# Patient Record
Sex: FEMALE | Race: OTHER | Employment: FULL TIME | URBAN - METROPOLITAN AREA
[De-identification: names, ages, dates, MRNs, and addresses within clinical notes are randomized per-mention and may not be internally consistent; named-entity substitution may affect disease eponyms.]

---

## 2021-10-22 ENCOUNTER — TELEPHONE (OUTPATIENT)
Dept: SURGERY | Age: 41
End: 2021-10-22

## 2021-10-22 NOTE — TELEPHONE ENCOUNTER
Called patient and left a voicemail to remind her of her upcoming appointment, our cancellation policy, our late policy and no visitor rule.

## 2021-10-25 ENCOUNTER — OFFICE VISIT (OUTPATIENT)
Dept: SURGERY | Age: 41
End: 2021-10-25

## 2021-10-25 VITALS
HEART RATE: 77 BPM | DIASTOLIC BLOOD PRESSURE: 76 MMHG | SYSTOLIC BLOOD PRESSURE: 117 MMHG | RESPIRATION RATE: 16 BRPM | BODY MASS INDEX: 31.04 KG/M2 | HEIGHT: 59 IN | TEMPERATURE: 98.3 F | OXYGEN SATURATION: 99 % | WEIGHT: 154 LBS

## 2021-10-25 DIAGNOSIS — K42.9 UMBILICAL HERNIA WITHOUT OBSTRUCTION AND WITHOUT GANGRENE: Primary | ICD-10-CM

## 2021-10-25 PROBLEM — E66.9 CLASS 1 OBESITY IN ADULT: Status: ACTIVE | Noted: 2021-10-25

## 2021-10-25 PROCEDURE — 99202 OFFICE O/P NEW SF 15 MIN: CPT | Performed by: SURGERY

## 2021-10-25 NOTE — PROGRESS NOTES
1. Have you been to the ER, urgent care clinic since your last visit? Hospitalized since your last visit? No    2. Have you seen or consulted any other health care providers outside of the [address] [place] [address] since your last visit? Include any pap smears or colon screening.  No

## 2021-10-25 NOTE — PROGRESS NOTES
Evelia Fraser is a 35 y.o. female who presents for evaluation of periumbilical abdominal pain. Information obtained via . Ms. Bhavna Hayden tells me that she has been experiencing periumbilical abdominal pain for approximately one year now. The pain has become progressively worse. Ms. Bhavna Hayden has also noted a bulge at the umbilicus. Furthermore, the pain occurs after meals. Occasional NSAID use. Associated abdominal bloating. No nausea or vomitting. No fevers or chills. No diarrhea. No clear h/o liane colored stool or tea colored urine. No dysuria or hematuria. Found to have an umbilical hernia. She has otherwise been in her usual state of health. Past Medical History:   Diagnosis Date    Class 1 obesity in adult 10/25/2021    Sinus congestion     Umbilical hernia without obstruction and without gangrene 10/25/2021     Past Surgical History:   Procedure Laterality Date    HX  SECTION      x2     History reviewed. No pertinent family history. Social History     Socioeconomic History    Marital status: SINGLE     Spouse name: Not on file    Number of children: Not on file    Years of education: Not on file    Highest education level: Not on file   Tobacco Use    Smoking status: Never Smoker    Smokeless tobacco: Never Used   Substance and Sexual Activity    Alcohol use: No     Social Determinants of Health     Financial Resource Strain:     Difficulty of Paying Living Expenses:    Food Insecurity:     Worried About Running Out of Food in the Last Year:     [address] in the Last Year:    Transportation Needs:     Lack of Transportation (Medical):      Lack of Transportation (Non-Medical):    Physical Activity:     Days of Exercise per Week:     Minutes of Exercise per Session:    Stress:     Feeling of Stress :    Social Connections:     Frequency of Communication with Friends and Family:     Frequency of Social Gatherings with Friends and Family:     Attends Jehovah's witness Services:     Active Member of Clubs or Organizations:     Attends Club or Organization Meetings:     Marital Status:      Review of systems negative except as noted. Review of Systems   Constitutional: Negative for chills and fever. Gastrointestinal: Positive for abdominal pain (Periumbilical.). Negative for nausea and vomiting. Abdominal bloating. No clear h/o liane colored stool. Genitourinary: Negative for dysuria and hematuria. No clear h/o tea colored urine. Physical Exam  Vitals reviewed. Constitutional:       General: She is not in acute distress. Appearance: Normal appearance. She is obese. HENT:      Head: Normocephalic and atraumatic. Eyes:      General: No scleral icterus. Cardiovascular:      Rate and Rhythm: Regular rhythm. Pulmonary:      Effort: Pulmonary effort is normal.   Abdominal:      General: There is no distension. Palpations: Abdomen is soft. Tenderness: There is no abdominal tenderness. There is no guarding or rebound. Hernia: A hernia is present. Hernia is present in the umbilical area (Small. Reducible. ). Musculoskeletal:         General: Normal range of motion. Cervical back: Neck supple. Lymphadenopathy:      Cervical: No cervical adenopathy. Neurological:      General: No focal deficit present. Mental Status: She is alert. ASSESSMENT and PLAN  Ms. Eden Mcclure is a 34 yo female with a small umbilical hernia. In view of the findings on H and P, she should benefit from repair since the hernia is symptomatic. However, also concerned that some of her symptoms may be due to cholelithiasis. Will check an abdominal ultrasound and will see her following that to review findings with her. If Ms. Eden Mcclure is found to have cholelithiasis, then she should also benefit from cholecystectomy as well. Discussed plan with Ms. Huntley, via her , and she is agreeable.

## 2021-11-01 ENCOUNTER — OFFICE VISIT (OUTPATIENT)
Dept: SURGERY | Age: 41
End: 2021-11-01

## 2021-11-01 ENCOUNTER — HOSPITAL ENCOUNTER (OUTPATIENT)
Dept: ULTRASOUND IMAGING | Age: 41
Discharge: HOME | End: 2021-11-01
Attending: SURGERY

## 2021-11-01 VITALS
OXYGEN SATURATION: 98 % | DIASTOLIC BLOOD PRESSURE: 77 MMHG | HEIGHT: 59 IN | BODY MASS INDEX: 31.45 KG/M2 | HEART RATE: 68 BPM | RESPIRATION RATE: 18 BRPM | SYSTOLIC BLOOD PRESSURE: 128 MMHG | WEIGHT: 156 LBS | TEMPERATURE: 98.3 F

## 2021-11-01 DIAGNOSIS — K42.9 UMBILICAL HERNIA WITHOUT OBSTRUCTION AND WITHOUT GANGRENE: ICD-10-CM

## 2021-11-01 DIAGNOSIS — K42.9 UMBILICAL HERNIA WITHOUT OBSTRUCTION AND WITHOUT GANGRENE: Primary | ICD-10-CM

## 2021-11-01 PROCEDURE — 76700 US EXAM ABDOM COMPLETE: CPT

## 2021-11-01 PROCEDURE — 99212 OFFICE O/P EST SF 10 MIN: CPT | Performed by: SURGERY

## 2021-11-01 NOTE — PROGRESS NOTES
1. Have you been to the ER, urgent care clinic since your last visit? Hospitalized since your last visit? No    2. Have you seen or consulted any other health care providers outside of the [address] [place] [address] since your last visit? Include any pap smears or colon screening.  No

## 2021-11-01 NOTE — PROGRESS NOTES
Chanda Taveras is a 35 y.o. female who returns following abdominal ultrasound. Information obtained from patient via . Ms. Barbara Lerma was last seen on 2021 for evaluation of periumbilical abdominal pain. Doing well since then. No change in periumbilical abdominal pain. No associated nausea or vomitting. No fevers or chills. Found to have an umbilical hernia as well as a concern for symptomatic cholelithiasis. She has otherwise been in her usual state of health. Abdominal ultrasound - 2021 - Hepatic steatosis. Past Medical History:   Diagnosis Date    Class 1 obesity in adult 10/25/2021    Sinus congestion     Umbilical hernia without obstruction and without gangrene 10/25/2021     Past Surgical History:   Procedure Laterality Date    HX  SECTION      x2     History reviewed. No pertinent family history. Social History     Socioeconomic History    Marital status: SINGLE     Spouse name: Not on file    Number of children: Not on file    Years of education: Not on file    Highest education level: Not on file   Tobacco Use    Smoking status: Never Smoker    Smokeless tobacco: Never Used   Substance and Sexual Activity    Alcohol use: No     Social Determinants of Health     Financial Resource Strain:     Difficulty of Paying Living Expenses:    Food Insecurity:     Worried About Running Out of Food in the Last Year:     [address] in the Last Year:    Transportation Needs:     Lack of Transportation (Medical):      Lack of Transportation (Non-Medical):    Physical Activity:     Days of Exercise per Week:     Minutes of Exercise per Session:    Stress:     Feeling of Stress :    Social Connections:     Frequency of Communication with Friends and Family:     Frequency of Social Gatherings with Friends and Family:     Attends Hinduism Services:     Active Member of Clubs or Organizations:     Attends Club or Organization Meetings:    Pinky Marital Status:      Review of systems negative except as noted. Review of Systems   Constitutional: Negative for chills and fever. Gastrointestinal: Positive for abdominal pain. Negative for nausea and vomiting. Physical Exam  Vitals reviewed. Constitutional:       General: She is not in acute distress. Appearance: Normal appearance. She is obese. HENT:      Head: Normocephalic and atraumatic. Cardiovascular:      Rate and Rhythm: Normal rate and regular rhythm. Pulmonary:      Effort: Pulmonary effort is normal.      Breath sounds: Normal breath sounds. Abdominal:      General: There is no distension. Palpations: Abdomen is soft. Tenderness: There is no abdominal tenderness. There is no guarding or rebound. Hernia: A hernia is present. Hernia is present in the umbilical area. Musculoskeletal:         General: Normal range of motion. Neurological:      General: No focal deficit present. Mental Status: She is alert. ASSESSMENT and PLAN  Reviewed ultrasound. Ms. Bee Retana is a 35 y.o. female with an umbilical hernia. Reviewed ultrasound findings with Ms. Huntley and reassured her that no gallstones were noted on the study. In terms of the umbilical hernia, she should benefit from repair since the hernia is symptomatic. I discussed umbilical hernia repair, possibly with mesh, with her today, via her ,  including the potential risks of bleeding, infection and hernia recurrence. She understands and will contact the office to schedule surgery or if any questions or concerns arise.

## 2022-03-18 PROBLEM — E66.9 CLASS 1 OBESITY IN ADULT: Status: ACTIVE | Noted: 2021-10-25

## 2022-03-18 PROBLEM — K42.9 UMBILICAL HERNIA WITHOUT OBSTRUCTION AND WITHOUT GANGRENE: Status: ACTIVE | Noted: 2021-10-25

## 2022-03-22 ENCOUNTER — OFFICE VISIT (OUTPATIENT)
Dept: ENT CLINIC | Age: 42
End: 2022-03-22

## 2022-03-22 VITALS
BODY MASS INDEX: 29.45 KG/M2 | DIASTOLIC BLOOD PRESSURE: 62 MMHG | OXYGEN SATURATION: 99 % | RESPIRATION RATE: 16 BRPM | TEMPERATURE: 97.8 F | HEART RATE: 97 BPM | WEIGHT: 150 LBS | HEIGHT: 60 IN | SYSTOLIC BLOOD PRESSURE: 116 MMHG

## 2022-03-22 DIAGNOSIS — J34.3 HYPERTROPHY OF BOTH INFERIOR NASAL TURBINATES: ICD-10-CM

## 2022-03-22 DIAGNOSIS — R42 DIZZINESS: ICD-10-CM

## 2022-03-22 DIAGNOSIS — H92.03 OTALGIA, BILATERAL: Primary | ICD-10-CM

## 2022-03-22 DIAGNOSIS — H93.8X1 PRESSURE SENSATION IN RIGHT EAR: ICD-10-CM

## 2022-03-22 PROCEDURE — 99204 OFFICE O/P NEW MOD 45 MIN: CPT | Performed by: NURSE PRACTITIONER

## 2022-03-22 RX ORDER — LORATADINE 10 MG/1
10 TABLET ORAL
COMMUNITY

## 2022-03-22 RX ORDER — BISMUTH SUBSALICYLATE 262 MG
1 TABLET,CHEWABLE ORAL DAILY
COMMUNITY

## 2022-03-22 RX ORDER — PREDNISONE 10 MG/1
TABLET ORAL
Qty: 24 TABLET | Refills: 0 | Status: SHIPPED | OUTPATIENT
Start: 2022-03-22

## 2022-03-22 NOTE — PROGRESS NOTES
Visit Vitals  /62 (BP 1 Location: Left upper arm, BP Patient Position: Sitting, BP Cuff Size: Large adult)   Pulse 97   Temp 97.8 °F (36.6 °C) (Temporal)   Resp 16   Ht 5' (1.524 m)   Wt 150 lb (68 kg)   SpO2 99%   BMI 29.29 kg/m²     Chief Complaint   Patient presents with    New Patient

## 2022-03-22 NOTE — PROGRESS NOTES
Otolaryngology-Head and Neck Surgery  New Patient Visit     Patient: Kurt Mason  YOB: 1980  MRN: [id number]  Date of Service: 3/22/2022    Chief Complaint: Bilateral otalgia    History of Present Illness: Kurt Mason is a 35y.o. year old female who presents today for discussion of      Reports bilateral otalgia for 10 years but worsening over past 3 years  Worse in right  +dizziness, congestion, feeling of wet ears, pressure in right ear, HA  Denies hearing changes  Describes the dizziness as constant nightly, room-spinning, lasting seconds -5 minutes  Worse with bending    Has seen PCP for past 3 years and had chronic sinus infections and given antibiotics which have helped until now    Tried claritin and only helps with congestion, Flonase    No ENT surgical HX      Past Medical History:  Past Medical History:   Diagnosis Date    Ear problems     Reflux gastritis     Sinus problem        Past Surgical History:   Past Surgical History:   Procedure Laterality Date    HX APPENDECTOMY N/A 03/20/2022       Medications:   Current Outpatient Medications   Medication Instructions    loratadine (CLARITIN) 10 mg, Oral    multivitamin (ONE A DAY) tablet 1 Tablet, Oral, DAILY       Allergies: Allergies   Allergen Reactions    Amoxicillin Itching       Social History:   Social History     Tobacco Use    Smoking status: Never Smoker    Smokeless tobacco: Never Used   Substance Use Topics    Alcohol use: Not on file    Drug use: Not on file        Family History:  History reviewed. No pertinent family history. Review of Systems:    Consitutional: denies fever, excessive weight gain or loss. Eyes: denies diplopia, eye pain. Integumentary: denies new concerning skin lesions. Ears, Nose, Mouth, Throat: denies except as per HPI.   Endocrine: denies hot or cold intolerance, increased thirst.  Respiratory: denies cough, hemoptysis, wheezing  Gastrointestinal: denies trouble swallowing, nausea, emesis, regurgitation  Musculoskeletal: denies muscle weakness or wasting  Cardiovascular: denies chest pain, shortness of breath  Neurologic: denies seizures, numbness or tingling, syncope  Hematologic: denies easy bleeding or bruising    Physical Examination:   Vitals:    03/22/22 1439   BP: 116/62   BP 1 Location: Left upper arm   BP Patient Position: Sitting   BP Cuff Size: Large adult   Pulse: 97   Temp: 97.8 °F (36.6 °C)   TempSrc: Temporal   Resp: 16   Height: 5' (1.524 m)   Weight: 150 lb (68 kg)   SpO2: 99%        General: Comfortable, pleasant, appears stated age  Voice: Strong, speaking in full sentences, no stridor    Face: No masses or lesions, facial strength symmetric. No nystagmus. Ears: External ears unremarkable. Bilateral ear canal clear. Tympanic membrane clear and intact, with visible landmarks. Clear middle ear space. Right TM sclerosis. Nose: External nose unremarkable. Dorsum midline. Anterior rhinoscopy demonstrates no lesions. Septum midline. Turbinates with hypertrophy. Oral Cavity / Oropharynx: No trismus. Mucosa pink and moist. No lesions. Tongue is midline and mobile. Palate elevates symmetrically. Uvula midline. Tonsils unremarkable. Base of tongue soft. Floor of mouth soft. Neck: Supple. No adenopathy. Thyroid unremarkable. Palpable laryngeal landmarks. Full neck range of motion   Neurologic: CN II - XI intact. Normal gait  G[place] Hallpike: negative    Assessment and Plan:   1. Bilateral otalgia   2. Pressure in right ear  3. Dizziness  4. Hypertrophy of turbinates    -Trial Flonase daily. Reinforced technique.   -Will RX steroid trial. Discussed side effects.  -[place] negative in office. Patient became extremely dizzy but no nystagmus noted. -May consider neurology consult: vestibular migraine? VNG?   -Low salt/caffeine diet. Patient with constant pressure in right ear. -Return to office in 3-4 weeks.            Russell Man MSN, FNP-C  [address] [facility]  [address] [facility]  Office Phone: [phone]

## 2022-04-27 ENCOUNTER — TELEPHONE (OUTPATIENT)
Dept: ENT CLINIC | Age: 42
End: 2022-04-27

## 2022-04-27 NOTE — TELEPHONE ENCOUNTER
LVM using  service informing pt that appt scheduled 4/29 has been cancelled due to [facility] no longer practicing at our office.     instructed pt (per my instruction) to return call to r/s

## 2022-05-04 ENCOUNTER — OFFICE VISIT (OUTPATIENT)
Dept: ENT CLINIC | Age: 42
End: 2022-05-04

## 2022-05-04 VITALS
SYSTOLIC BLOOD PRESSURE: 106 MMHG | RESPIRATION RATE: 18 BRPM | DIASTOLIC BLOOD PRESSURE: 76 MMHG | HEIGHT: 60 IN | OXYGEN SATURATION: 97 % | WEIGHT: 150.6 LBS | BODY MASS INDEX: 29.57 KG/M2 | HEART RATE: 100 BPM

## 2022-05-04 DIAGNOSIS — J32.0 CHRONIC MAXILLARY SINUSITIS: Primary | ICD-10-CM

## 2022-05-04 DIAGNOSIS — H81.10 BENIGN PAROXYSMAL POSITIONAL VERTIGO, UNSPECIFIED LATERALITY: ICD-10-CM

## 2022-05-04 PROCEDURE — 99213 OFFICE O/P EST LOW 20 MIN: CPT | Performed by: SPECIALIST

## 2022-05-04 RX ORDER — SULFAMETHOXAZOLE AND TRIMETHOPRIM 800; 160 MG/1; MG/1
1 TABLET ORAL 2 TIMES DAILY
Qty: 20 TABLET | Refills: 0 | Status: SHIPPED | OUTPATIENT
Start: 2022-05-04 | End: 2022-05-14

## 2022-05-04 NOTE — PROGRESS NOTES
Subjective:        Guillermo Purcell   35 y.o.   1980     Return patient Visit  22-year-old Welsh-speaking female whose history is obtained through an . The patient was previously seen for otalgia and headache and was treated with Flonase and steroids. Unfortunately the steroids caused her heart to beat fast and she discontinued this. She relates that she has pressure in her forehead and cheeks. She sometimes has dizziness described as an off-balance sensation or spinning that can for 30 seconds to a minute and is associated with laying down and rolling to one side of the other and also with looking up. She denies any change in her hearing. Review of Systems  ROS         Heent: No diplopia, no hearing loss, no tinnitis, no nasal congestion, no sinus pain, no dysphygia, no sore throat. Neck:  No neck mass, no neck pain  Respiratory:  No cough, no hemoptysis, no SOB, no wheezing  CV:  No chest pain, no arrythmias, no syncope  GI:  No nausea, no vomiting, no abdominal pain  Neuro:  No headache, no loss of consciousness, no paralysis, no weakness      Physical Exam    General: NAD, well-developed well-nourished  Eyes: PERRLA, EOMs intact no nystagmus  Ears: External canals clear, TMs:  Clear, Tuning fork exam normal  Septum midline, turbinates normal, mucosa normal, no external deformity  Mouth: Mucosa normal, tongue normal, floor of mouth normal  Throat: Clear, tonsils absent  Neck: Supple without masses, no bruits     Neuro: Cranial nerves II through XII grossly intact         Past Medical History:   Diagnosis Date    Ear problems     Reflux gastritis     Sinus problem      Past Surgical History:   Procedure Laterality Date    HX APPENDECTOMY N/A 03/20/2022      History reviewed. No pertinent family history.   Social History     Tobacco Use    Smoking status: Never Smoker    Smokeless tobacco: Never Used   Substance Use Topics    Alcohol use: Not on file      Prior to Admission medications    Medication Sig Start Date End Date Taking? Authorizing Provider   trimethoprim-sulfamethoxazole (BACTRIM DS, SEPTRA DS) 160-800 mg per tablet Take 1 Tablet by mouth two (2) times a day for 10 days. 5/4/22 5/14/22 Yes Alexandrea Choudhury MD   loratadine (Claritin) 10 mg tablet Take 10 mg by mouth. Patient not taking: Reported on 5/4/2022    Provider, Historical   multivitamin (ONE A DAY) tablet Take 1 Tablet by mouth daily. Patient not taking: Reported on 5/4/2022    Provider, Historical   predniSONE (DELTASONE) 10 mg tablet Take 30 mg x 4 days, then 20 mg x 4 days, then 10 mg x 4 days then stop  Patient not taking: Reported on 5/4/2022 3/22/22   Pavithra Kenney NP                    Objective:     Visit Vitals  /76   Pulse 100   Resp 18   Ht 5' (1.524 m)   Wt 150 lb 9.6 oz (68.3 kg)   SpO2 97%   BMI 29.41 kg/m²        Allergies   Allergen Reactions    Amoxicillin Itching         Assessment/Plan:   Sinusitis, probable BPPV: CT scan of sinuses, Bactrim DS, follow-up 1 month, discussed BPPV through  and told her that the prognosis for this is probably very good but that it could recur. Encounter Diagnoses   Name Primary?  Chronic maxillary sinusitis Yes    Benign paroxysmal positional vertigo, unspecified laterality      Orders Placed This Encounter    CT MAXILLOFACIAL WO CONT    trimethoprim-sulfamethoxazole (BACTRIM DS, SEPTRA DS) 160-800 mg per tablet     Follow-up and Dispositions    · Return in about 1 month (around 6/4/2022). Betty Vallecillo MD, [address] [facility]    [address]  [facility], [address] [facility] [phone]

## 2022-05-04 NOTE — PROGRESS NOTES
Chief Complaint   Patient presents with    Nasal Polyps    Ear Pain     bilateral        Visit Vitals  Pulse 100   Resp 18   Ht 5' (1.524 m)   Wt 150 lb 9.6 oz (68.3 kg)   SpO2 97%   BMI 29.41 kg/m²

## 2022-05-23 ENCOUNTER — TELEPHONE (OUTPATIENT)
Dept: ENT CLINIC | Age: 42
End: 2022-05-23

## 2022-05-23 NOTE — TELEPHONE ENCOUNTER
Called pt using the  service in regards to rescheduling her appt with Dr. Stevie Duffy on 06/01 due to her being in surgery. Pt did not answer so the  nicole stating that we needed to r/s her appt on 06/01 and to contact our office to r/s the appt and provided our phone number.

## 2022-05-25 ENCOUNTER — TELEPHONE (OUTPATIENT)
Dept: ENT CLINIC | Age: 42
End: 2022-05-25

## 2022-05-25 NOTE — TELEPHONE ENCOUNTER
Attempted to call the pt w/ the  to r/s their next appt due to Dr. Arnoldo Dang being in surgery. Unable to reach the pt so lvm stating that this appt was cancelled and to call the office to r/s the appt.

## 2022-06-01 ENCOUNTER — TELEPHONE (OUTPATIENT)
Dept: ENT CLINIC | Age: 42
End: 2022-06-01

## 2022-06-01 NOTE — TELEPHONE ENCOUNTER
Pt came in thinking her appt was still today since she did not receive our messages. I used the  service to speak with the pt to let her know that we had left her voicemails in an attempt to r/s her appt due to Dr. Shayne Mcgrath being in surgery today. The pt stated that she did not get our messages and provided a new number for us to reach her at. The pt did r/s her appt however, she was only able to do later afternoons so she was r/s to the soonest available we had. The pt also stated that the imaging Dr. John Ortiz ordered for her she was not able to get at this time and will not be able to get it before her appt due to financial issues. The pt still wanted to be seen without having a scan done due to having some more issues arise. Pt was added to the waitlist for this reason and was told we would call her if something opens up sooner.

## 2022-06-28 ENCOUNTER — OFFICE VISIT (OUTPATIENT)
Dept: ENT CLINIC | Age: 42
End: 2022-06-28

## 2022-06-28 VITALS
WEIGHT: 150 LBS | TEMPERATURE: 97.9 F | OXYGEN SATURATION: 98 % | HEIGHT: 65 IN | SYSTOLIC BLOOD PRESSURE: 108 MMHG | BODY MASS INDEX: 24.99 KG/M2 | HEART RATE: 64 BPM | RESPIRATION RATE: 16 BRPM | DIASTOLIC BLOOD PRESSURE: 62 MMHG

## 2022-06-28 DIAGNOSIS — H93.8X1 PRESSURE SENSATION IN RIGHT EAR: ICD-10-CM

## 2022-06-28 DIAGNOSIS — J32.0 CHRONIC MAXILLARY SINUSITIS: Primary | ICD-10-CM

## 2022-06-28 DIAGNOSIS — R42 DIZZINESS: ICD-10-CM

## 2022-06-28 PROCEDURE — 99213 OFFICE O/P EST LOW 20 MIN: CPT | Performed by: OTOLARYNGOLOGY

## 2022-06-28 RX ORDER — AZELASTINE 1 MG/ML
1 SPRAY, METERED NASAL 2 TIMES DAILY
Qty: 1 EACH | Refills: 1 | Status: SHIPPED | OUTPATIENT
Start: 2022-06-28

## 2022-06-28 NOTE — PROGRESS NOTES
Otolaryngology-Head and Neck Surgery  Follow Up Patient Visit     Patient: Yajaira Talbert  YOB: 1980  MRN: [id number]  Date of Service:    6/28/2022    Chief Complaint: Follow up sinus    History of Present Illness: Yajaira Talbert is a 35y.o. year old female who has a history of chronic dizziness and recurrent sinus infections    Feels like generally dizziness worse when sinuses are bad    Saw Dr Lauren Guerra and given Rx for bactrim which helped tremendously both with sinus pressure and dizziness    Was not able to get sinus CT scan due to lack of insurance    Tried PO prednisone which she did not tolerate    Has used flonase without much relief     Past Medical History:  Past Medical History:   Diagnosis Date    Ear problems     Reflux gastritis     Sinus problem        Past Surgical History:   Past Surgical History:   Procedure Laterality Date    HX APPENDECTOMY N/A 03/20/2022       Medications:   Current Outpatient Medications   Medication Instructions    azelastine (ASTELIN) 137 mcg (0.1 %) nasal spray 1 Spray, Both Nostrils, 2 TIMES DAILY, Use in each nostril as directed    loratadine (CLARITIN) 10 mg    multivitamin (ONE A DAY) tablet 1 Tablet, DAILY    predniSONE (DELTASONE) 10 mg tablet Take 30 mg x 4 days, then 20 mg x 4 days, then 10 mg x 4 days then stop       Allergies: Allergies   Allergen Reactions    Amoxicillin Itching       Social History:   Social History     Tobacco Use    Smoking status: Never Smoker    Smokeless tobacco: Never Used   Substance Use Topics    Alcohol use: Not on file    Drug use: Not on file       Family History:  No family history on file. Review of Systems:  Consitutional: denies fever, excessive weight gain or loss. Eyes: denies diplopia, eye pain. Integumentary: denies new concerning skin lesions. Ears, Nose, Mouth, Throat: denies except as per HPI.   Endocrine: denies hot or cold intolerance, increased thirst.  Respiratory: denies cough, hemoptysis, wheezing  Gastrointestinal: denies trouble swallowing, nausea, emesis, regurgitation  Musculoskeletal: denies muscle weakness or wasting  Cardiovascular: denies chest pain, shortness of breath  Neurologic: denies seizures, numbness or tingling, syncope  Hematologic: denies easy bleeding or bruising    Physical Examination:   Vitals:    06/28/22 1553   BP: 108/62   BP 1 Location: Right upper arm   BP Patient Position: Sitting   BP Cuff Size: Large adult   Pulse: 64   Temp: 97.9 °F (36.6 °C)   TempSrc: Temporal   Resp: 16   Height: 5' 5\" (1.651 m)   Weight: 150 lb (68 kg)   SpO2: 98%         General: Comfortable, pleasant, appears stated age  Voice: Strong, speaking in full sentences, no stridor    Face: No masses or lesions, facial strength symmetric   Ears: External ears unremarkable. Bilateral ear canal clear. Tympanic membrane clear and intact, with visible landmarks. Clear middle ear space  Nose: External nose unremarkable. Dorsum midline. Anterior rhinoscopy demonstrates no lesions. Septum midline. Turbinates without hypertrophy. Oral Cavity / Oropharynx: No trismus. Mucosa pink and moist. No lesions. Tongue is midline and mobile. Palate elevates symmetrically. Uvula midline. Tonsils unremarkable. Base of tongue soft. Floor of mouth soft. Neck: Supple. No adenopathy. Thyroid unremarkable. Palpable laryngeal landmarks. Full neck range of motion   Neurologic: CN II - XI intact. Normal gait      Assessment and Plan:   1. Recurrent sinusitis  2. Sinus pressure  3.  Dizziness  - Better after course of bactrim - this is the best she's felt in some time  - Discussed my next steps would be to plan for scope, CT scan however without insurance this will be expensive  - Can trial astelin nasal spray instead of flonase - coupon via [facility] discussed with patient   - Also resources for access now provided to pt as well   - Follow up PRN for now - but can let us know if can move forward with CT etc        The patient was instructed to return to clinic if no improvement or progression of symptoms. Signs to watch out for reviewed.       MD Darrius [facility]  [address] [place] [address] [address]  [facility]  Office Phone: [phone]

## 2022-07-08 ENCOUNTER — TELEPHONE (OUTPATIENT)
Dept: ENT CLINIC | Age: 42
End: 2022-07-08

## 2022-07-08 NOTE — TELEPHONE ENCOUNTER
Alice Schmidt (pt contact) called stating that pt was having bad ear pain. He stated that she sees Dr. [place] Pate and Dr. [place] Pate told the pt at her last visit that is she starts to have bad ear pain again then she should call office and that we would get her in within the next day or the next week. Where would be the best place to schedule this pt?

## 2022-07-11 NOTE — TELEPHONE ENCOUNTER
Spoke to  [employer]  and he asked me to call him at 12 noon bc he was at work. . He stated she was feeling better.

## 2023-03-13 ENCOUNTER — HOSPITAL ENCOUNTER (OUTPATIENT)
Dept: LAB | Age: 43
Discharge: HOME | End: 2023-03-13

## 2023-03-13 ENCOUNTER — OFFICE VISIT (OUTPATIENT)
Dept: FAMILY MEDICINE CLINIC | Age: 43
End: 2023-03-13

## 2023-03-13 VITALS
WEIGHT: 151 LBS | SYSTOLIC BLOOD PRESSURE: 132 MMHG | HEIGHT: 61 IN | HEART RATE: 78 BPM | OXYGEN SATURATION: 100 % | DIASTOLIC BLOOD PRESSURE: 69 MMHG | BODY MASS INDEX: 28.51 KG/M2 | TEMPERATURE: 98.2 F

## 2023-03-13 DIAGNOSIS — R35.0 URINARY FREQUENCY: ICD-10-CM

## 2023-03-13 DIAGNOSIS — D69.1 PLATELET DYSFUNCTION (HCC): ICD-10-CM

## 2023-03-13 DIAGNOSIS — D69.1 PLATELET DYSFUNCTION (HCC): Primary | ICD-10-CM

## 2023-03-13 PROCEDURE — 81001 URINALYSIS AUTO W/SCOPE: CPT

## 2023-03-13 PROCEDURE — 80053 COMPREHEN METABOLIC PANEL: CPT

## 2023-03-13 PROCEDURE — 99204 OFFICE O/P NEW MOD 45 MIN: CPT | Performed by: NURSE PRACTITIONER

## 2023-03-13 PROCEDURE — 84443 ASSAY THYROID STIM HORMONE: CPT

## 2023-03-13 PROCEDURE — 36415 COLL VENOUS BLD VENIPUNCTURE: CPT

## 2023-03-13 PROCEDURE — 80061 LIPID PANEL: CPT

## 2023-03-13 PROCEDURE — 85025 COMPLETE CBC W/AUTO DIFF WBC: CPT

## 2023-03-13 RX ORDER — CEPHALEXIN 250 MG/1
CAPSULE ORAL
COMMUNITY
Start: 2023-03-10

## 2023-03-13 RX ORDER — PREDNISONE 20 MG/1
TABLET ORAL
COMMUNITY
Start: 2023-03-10

## 2023-03-13 NOTE — PROGRESS NOTES
Patient discharged with AVS. Name and date of birth verified. Instructed to schedule an appointment to return in 2-3 weeks. Patient was given an opportunity to voice questions/concerns. All questions were addressed. [facility] interpretor #[id number] assisted.

## 2023-03-13 NOTE — PROGRESS NOTES
3/13/2023 : Xiao Hicks (: 1980) is a 38 y.o. female,  new patient, here for evaluation of the following chief complaint(s):  Labs (Health screening labs. Patients mother has HX of problems with platelets.), Ear Pain (Patient [facility]  Right [facility]. ), and Immunization/Injection (Patient interested in Hep C, Tdap vaccines.)     ASSESSMENT/PLAN:  Below is the assessment and plan developed based on review of pertinent history, physical exam, labs, studies, and medications. 1. Platelet dysfunction (HCC)  -     LIPID PANEL; Future  -     METABOLIC PANEL, COMPREHENSIVE; Future  -     CBC WITH AUTOMATED DIFF; Future  -     TSH 3RD GENERATION; Future  2. Urinary frequency  -     URINALYSIS W/ REFLEX CULTURE; Future  Return for LK 2-3 weeks results, hernia. SUBJECTIVE/OBJECTIVE:  HPI Went Friday to [facility].  Infection and inflammation of her ear. She has had this problem for years. She did consult a specialist but could not afford x-rays. Has dizziness when she bends down. She is urinating often. She has been taking prednisione. She is fasting. No results found for any visits on 23. Current Medications:   Current Outpatient Medications   Medication Sig    predniSONE (DELTASONE) 20 mg tablet     cephALEXin (KEFLEX) 250 mg capsule TAKE 2 CAPSULES BY MOUTH TWICE A DAY    azelastine (ASTELIN) 137 mcg (0.1 %) nasal spray 1 [place] by Both Nostrils route two (2) times a day. Use in each nostril as directed (Patient not taking: Reported on 3/13/2023)    loratadine (CLARITIN) 10 mg tablet Take 10 mg by mouth. (Patient not taking: No sig reported)    multivitamin (ONE A DAY) tablet Take 1 Tablet by mouth daily.  (Patient not taking: No sig reported)    predniSONE (DELTASONE) 10 mg tablet Take 30 mg x 4 days, then 20 mg x 4 days, then 10 mg x 4 days then stop (Patient not taking: Reported on 3/13/2023)     Review of Systems: Negative for: fever, chest pain, shortness of breath, leg swelling. Social History:  reports that she has never smoked. She has never used smokeless tobacco. She reports that she does not drink alcohol and does not use drugs. Physical Examination: Patient's last menstrual period was 02/17/2023 (approximate). Blood pressure 132/69, pulse 78, temperature 98.2 °F (36.8 °C), temperature source Temporal, height 5' 1.22\" (1.555 m), weight 151 lb (68.5 kg), last menstrual period 02/17/2023, SpO2 100 %. TMs clear. No suprapubic tenderness. General appearance - well developed, no acute distress. Chest - clear to auscultation. Heart - regular rate and rhythm without murmurs, rubs, or gallops. Abdomen - bowel sounds present x 4, NT, ND  Extremities - no CCE. An electronic signature was used to authenticate this note.   -- Monique Dennison NP

## 2023-03-14 LAB
ALBUMIN SERPL-MCNC: 3.9 G/DL (ref 3.5–5)
ALBUMIN/GLOB SERPL: 1.1 (ref 1.1–2.2)
ALP SERPL-CCNC: 68 U/L (ref 45–117)
ALT SERPL-CCNC: 23 U/L (ref 12–78)
AMORPH CRY URNS QL MICRO: ABNORMAL
ANION GAP SERPL CALC-SCNC: 9 MMOL/L (ref 5–15)
APPEARANCE UR: ABNORMAL
AST SERPL-CCNC: 11 U/L (ref 15–37)
BACTERIA URNS QL MICRO: ABNORMAL /HPF
BASOPHILS # BLD: 0.1 K/UL (ref 0–0.1)
BASOPHILS NFR BLD: 0 % (ref 0–1)
BILIRUB SERPL-MCNC: 0.4 MG/DL (ref 0.2–1)
BILIRUB UR QL: NEGATIVE
BUN SERPL-MCNC: 14 MG/DL (ref 6–20)
BUN/CREAT SERPL: 22 (ref 12–20)
CALCIUM SERPL-MCNC: 9.6 MG/DL (ref 8.5–10.1)
CAOX CRY URNS QL MICRO: ABNORMAL
CHLORIDE SERPL-SCNC: 106 MMOL/L (ref 97–108)
CHOLEST SERPL-MCNC: 240 MG/DL
CO2 SERPL-SCNC: 28 MMOL/L (ref 21–32)
COLOR UR: ABNORMAL
CREAT SERPL-MCNC: 0.63 MG/DL (ref 0.55–1.02)
DIFFERENTIAL METHOD BLD: ABNORMAL
EOSINOPHIL # BLD: 0 K/UL (ref 0–0.4)
EOSINOPHIL NFR BLD: 0 % (ref 0–7)
EPITH CASTS URNS QL MICRO: ABNORMAL /LPF
ERYTHROCYTE [DISTWIDTH] IN BLOOD BY AUTOMATED COUNT: 12.1 % (ref 11.5–14.5)
GLOBULIN SER CALC-MCNC: 3.5 G/DL (ref 2–4)
GLUCOSE SERPL-MCNC: 75 MG/DL (ref 65–100)
GLUCOSE UR STRIP.AUTO-MCNC: NEGATIVE MG/DL
HCT VFR BLD AUTO: 48.5 % (ref 35–47)
HDLC SERPL-MCNC: 44 MG/DL
HDLC SERPL: 5.5 (ref 0–5)
HGB BLD-MCNC: 15.7 G/DL (ref 11.5–16)
HGB UR QL STRIP: NEGATIVE
IMM GRANULOCYTES # BLD AUTO: 0.1 K/UL (ref 0–0.04)
IMM GRANULOCYTES NFR BLD AUTO: 1 % (ref 0–0.5)
KETONES UR QL STRIP.AUTO: NEGATIVE MG/DL
LDLC SERPL CALC-MCNC: 157.6 MG/DL (ref 0–100)
LEUKOCYTE ESTERASE UR QL STRIP.AUTO: NEGATIVE
LYMPHOCYTES # BLD: 4.8 K/UL (ref 0.8–3.5)
LYMPHOCYTES NFR BLD: 31 % (ref 12–49)
MCH RBC QN AUTO: 30.3 PG (ref 26–34)
MCHC RBC AUTO-ENTMCNC: 32.4 G/DL (ref 30–36.5)
MCV RBC AUTO: 93.6 FL (ref 80–99)
MONOCYTES # BLD: 0.9 K/UL (ref 0–1)
MONOCYTES NFR BLD: 6 % (ref 5–13)
MUCOUS THREADS URNS QL MICRO: ABNORMAL /LPF
NEUTS SEG # BLD: 9.8 K/UL (ref 1.8–8)
NEUTS SEG NFR BLD: 62 % (ref 32–75)
NITRITE UR QL STRIP.AUTO: NEGATIVE
NRBC # BLD: 0 K/UL (ref 0–0.01)
NRBC BLD-RTO: 0 PER 100 WBC
PH UR STRIP: 5.5 (ref 5–8)
PLATELET # BLD AUTO: 359 K/UL (ref 150–400)
PMV BLD AUTO: 11 FL (ref 8.9–12.9)
POTASSIUM SERPL-SCNC: 3.9 MMOL/L (ref 3.5–5.1)
PROT SERPL-MCNC: 7.4 G/DL (ref 6.4–8.2)
PROT UR STRIP-MCNC: ABNORMAL MG/DL
RBC # BLD AUTO: 5.18 M/UL (ref 3.8–5.2)
RBC #/AREA URNS HPF: ABNORMAL /HPF (ref 0–5)
SODIUM SERPL-SCNC: 143 MMOL/L (ref 136–145)
SP GR UR REFRACTOMETRY: >1.03
TRIGL SERPL-MCNC: 192 MG/DL (ref ?–150)
TSH SERPL DL<=0.05 MIU/L-ACNC: 1.6 UIU/ML (ref 0.36–3.74)
UA: UC IF INDICATED,UAUC: ABNORMAL
UROBILINOGEN UR QL STRIP.AUTO: 0.2 EU/DL (ref 0.2–1)
VLDLC SERPL CALC-MCNC: 38.4 MG/DL
WBC # BLD AUTO: 15.6 K/UL (ref 3.6–11)
WBC URNS QL MICRO: ABNORMAL /HPF (ref 0–4)

## 2023-04-04 ENCOUNTER — OFFICE VISIT (OUTPATIENT)
Dept: FAMILY MEDICINE CLINIC | Facility: CLINIC | Age: 43
End: 2023-04-04

## 2023-04-04 PROCEDURE — 99214 OFFICE O/P EST MOD 30 MIN: CPT | Performed by: NURSE PRACTITIONER

## 2023-04-04 NOTE — PROGRESS NOTES
Chief Complaint   Patient presents with    Results     Discuss last lab results    Abdominal Pain     Pt c/o occasional \"ovarian pain\" on the right side, requesting US; of note, irregular cycle     Visit Vitals  /71 (BP 1 Location: Left upper arm, BP Patient Position: Sitting)   Pulse 85   Temp 98.1 °F (36.7 °C) (Temporal)   Resp 18   Ht 5' 1.22\" (1.555 m)   Wt 157 lb (71.2 kg)   LMP 01/04/2023 Comment: pt states she hasn't had a period in ~3 months; pt states she is not pregnant   SpO2 100%   BMI 29.45 kg/m²       Coordination of Care  1. Have you been to the ER, urgent care clinic since your last visit? Hospitalized since your last visit? No    2. Have you seen or consulted any other health care providers outside of the [address] [place] [address] since your last visit? Include any pap smears or colon screening. No    Does the patient need refills? NO    Learning Assessment Complete?  yes  Depression Screening complete in the past 12 months? yes

## 2023-04-04 NOTE — PROGRESS NOTES
2023 : Eunice Rebollar (: 1980) is a 38 y.o. female,  established patient, here for evaluation of the following chief complaint(s):  Results (Discuss last lab results) and Abdominal Pain (Pt c/o occasional \"ovarian pain\" on the right side, requesting US; of note, irregular cycle)     ASSESSMENT/PLAN: She strongly stated that she does not want to incur additional bills at this time. Declines US studies of abdomen. It hurts sometimes - eat a lot or exert too much force. Instead, will have her return for a pelvic exam to evaluate. Also declines seeing surgeon for hernia. Discussed there is no obstruction or gangrene. Take care with exertion and eating large meals. Below is the assessment and plan developed based on review of pertinent history, physical exam, labs, studies, and medications. Return 23 for labs and pelvic exam.  1. Lower abdominal pain  Comments:  right ovary pain  Orders:  -     An Oli / Yasmeen Butts; Future  -     HCG QL SERUM; Future  2. Elevated cholesterol  3. Umbilical hernia without obstruction and without gangrene  Return for appt for nonfasting labs; LK 1 month abdominal pain with pelvic exam.  The 10-year ASCVD risk score (Katharina DK, et al., 2019) is: 1.3%    Values used to calculate the score:      Age: 38 years      Sex: Female      Is Non- : No      Diabetic: No      Tobacco smoker: No      Systolic Blood Pressure: 381 mmHg      Is BP treated: No      HDL Cholesterol: 44 MG/DL      Total Cholesterol: 240 MG/DL  SUBJECTIVE/OBJECTIVE:  HPI   -irregular menses  Last pap  of this year. All was normal.  She had to pay for it out of pocket. .  I note elevated wbc count. A few days ago she had a little bit of spotting. Every 2-3 months. Hasn't used anything in years for bcp. Was given a medication to regulate period but it caused a darkening on her face on the right side.   Component      Latest Ref Rng & Units 3/13/2023   WBC      3.6 - 11.0 K/uL 15.6 (H)   RBC      3.80 - 5.20 M/uL 5.18   HGB      11.5 - 16.0 g/dL 15.7   HCT      35.0 - 47.0 % 48.5 (H)   MCV      80.0 - 99.0 FL 93.6   MCH      26.0 - 34.0 PG 30.3   MCHC      30.0 - 36.5 g/dL 32.4   RDW      11.5 - 14.5 % 12.1   PLATELET      553 - 569 K/uL 359   MPV      8.9 - 12.9 FL 11.0   NRBC      0  WBC 0.0   ABSOLUTE NRBC      0.00 - 0.01 K/uL 0.00   NEUTROPHILS      32 - 75 % 62   LYMPHOCYTES      12 - 49 % 31   MONOCYTES      5 - 13 % 6   EOSINOPHILS      0 - 7 % 0   BASOPHILS      0 - 1 % 0   IMMATURE GRANULOCYTES      0.0 - 0.5 % 1 (H)   ABS. NEUTROPHILS      1.8 - 8.0 K/UL 9.8 (H)   ABS. LYMPHOCYTES      0.8 - 3.5 K/UL 4.8 (H)   ABS. MONOCYTES      0.0 - 1.0 K/UL 0.9   ABS. EOSINOPHILS      0.0 - 0.4 K/UL 0.0   ABS. BASOPHILS      0.0 - 0.1 K/UL 0.1   ABS. IMM.  GRANS.      0.00 - 0.04 K/UL 0.1 (H)   DF       AUTOMATED   Color       DARK YELLOW   Appearance      CLEAR   TURBID (A)   Specific gravity       >1.030   pH (UA)      5.0 - 8.0   5.5   Protein      NEG mg/dL TRACE (A)   Glucose      NEG mg/dL Negative   Ketone      NEG mg/dL Negative   Bilirubin      NEG   Negative   Blood      NEG   Negative   Urobilinogen      0.2 - 1.0 EU/dL 0.2   Nitrites      NEG   Negative   Leukocyte Esterase      NEG   Negative   WBC      0 - 4 /hpf 0-4   RBC      0 - 5 /hpf 0-5   Epithelial cells      FEW /lpf FEW   Bacteria      NEG /hpf 1+ (A)   UA:UC IF INDICATED      CNI   CULTURE NOT INDICATED BY UA RESULT   Mucus      NEG /lpf 2+ (A)   Amorphous Crystals      NEG 2+ (A)   CA Oxalate crystals      NEG   FEW (A)   Sodium      136 - 145 mmol/L 143   Potassium      3.5 - 5.1 mmol/L 3.9   Chloride      97 - 108 mmol/L 106   CO2      21 - 32 mmol/L 28   Anion gap      5 - 15 mmol/L 9   Glucose      65 - 100 mg/dL 75   BUN      6 - 20 MG/DL 14   Creatinine      0.55 - 1.02 MG/DL 0.63   BUN/Creatinine ratio      12 - 20   22 (H)   eGFR      >60 ml/min/1.73m2 >60 Calcium      8.5 - 10.1 MG/DL 9.6   Bilirubin, total      0.2 - 1.0 MG/DL 0.4   ALT      12 - 78 U/L 23   AST      15 - 37 U/L 11 (L)   Alk. phosphatase      45 - 117 U/L 68   Protein, total      6.4 - 8.2 g/dL 7.4   Albumin      3.5 - 5.0 g/dL 3.9   Globulin      2.0 - 4.0 g/dL 3.5   A-G Ratio      1.1 - 2.2   1.1   Cholesterol, total      <200 MG/ (H)   Triglyceride      <150 MG/ (H)   HDL Cholesterol      MG/DL 44   LDL, calculated      0 - 100 MG/.6 (H)   VLDL, calculated      MG/DL 38.4   CHOL/HDL Ratio      0.0 - 5.0   5.5 (H)   TSH      0.36 - 3.74 uIU/mL 1.60     No results found for any visits on 04/04/23. Current Medications:   Current Outpatient Medications   Medication Sig    predniSONE (DELTASONE) 20 mg tablet     cephALEXin (KEFLEX) 250 mg capsule TAKE 2 CAPSULES BY MOUTH TWICE A DAY (Patient not taking: Reported on 4/4/2023)    azelastine (ASTELIN) 137 mcg (0.1 %) nasal spray 1 [place] by Both Nostrils route two (2) times a day. Use in each nostril as directed (Patient not taking: Reported on 3/13/2023)    loratadine (CLARITIN) 10 mg tablet Take 10 mg by mouth. (Patient not taking: No sig reported)    multivitamin (ONE A DAY) tablet Take 1 Tablet by mouth daily. (Patient not taking: No sig reported)    predniSONE (DELTASONE) 10 mg tablet Take 30 mg x 4 days, then 20 mg x 4 days, then 10 mg x 4 days then stop (Patient not taking: Reported on 3/13/2023)    amoxicillin-clavulanate (AUGMENTIN) 875-125 mg per tablet Take 1 Tab by mouth every twelve (12) hours. (Patient not taking: Reported on 10/25/2021)    mometasone (NASONEX) 50 mcg/Actuation nasal spray 1 [place] by Both Nostrils route two (2) times a day. (Patient not taking: Reported on 10/25/2021)   She had appendectomy that burst, in hospital for 8 days. She was told her hernia would be fixed and it was but now it is back. December 2021 was surgery. Hernia recurred 7 mnths after the surgery.   If she lifts something heavy she notices. Or after eating she notices. Review of Systems: Negative for: fever, chest pain, shortness of breath, leg swelling. Social History:  reports that she has never smoked. She has never used smokeless tobacco. She reports that she does not drink alcohol and does not use drugs. Physical Examination: Patient's last menstrual period was 01/04/2023. Blood pressure 119/71, pulse 85, temperature 98.1 °F (36.7 °C), temperature source Temporal, resp. rate 18, height 5' 1.22\" (1.555 m), weight 157 lb (71.2 kg), last menstrual period 01/04/2023, SpO2 100 %. General appearance - well developed, no acute distress. Chest - clear to auscultation. Heart - regular rate and rhythm without, rubs, or gallops. Abdomen - bowel sounds present x 4, NT, ND. Pelvic exam deferred today. Extremities - no CCE. The hernia is reducible. An electronic signature was used to authenticate this note.   -- Ravindra Dao NP

## 2023-04-04 NOTE — PROGRESS NOTES
Vu Venu seen at d/c, full name and  verified. After Visit Summary provided and reviewed. Advised patient to schedule follow up appointment before she leaves today. Provided and reviewed information on a healthy low fat diet with patient. Lab slip with orders completed and laboratory locations information handed to patient. Patient advised to have lab work completed as soon as possible. Instructions were reviewed and patient verbalized understanding. Medication list reviewed. Opportunity for questions provided, patient verbalizes understanding.

## 2023-04-13 ENCOUNTER — HOSPITAL ENCOUNTER (OUTPATIENT)
Dept: LAB | Age: 43
Discharge: HOME | End: 2023-04-13

## 2023-04-13 PROCEDURE — 84703 CHORIONIC GONADOTROPIN ASSAY: CPT

## 2023-04-13 PROCEDURE — 36415 COLL VENOUS BLD VENIPUNCTURE: CPT

## 2023-04-13 PROCEDURE — 87591 N.GONORRHOEAE DNA AMP PROB: CPT

## 2023-04-14 LAB — HCG SERPL QL: NEGATIVE

## 2023-04-17 LAB
C TRACH RRNA SPEC QL NAA+PROBE: NEGATIVE
N GONORRHOEA RRNA SPEC QL NAA+PROBE: NEGATIVE
SPECIMEN SOURCE: NORMAL

## 2023-05-01 ENCOUNTER — OFFICE VISIT (OUTPATIENT)
Dept: FAMILY MEDICINE CLINIC | Facility: CLINIC | Age: 43
End: 2023-05-01

## 2023-05-01 VITALS
TEMPERATURE: 98.4 F | BODY MASS INDEX: 29.64 KG/M2 | DIASTOLIC BLOOD PRESSURE: 66 MMHG | HEIGHT: 61 IN | OXYGEN SATURATION: 96 % | WEIGHT: 157 LBS | SYSTOLIC BLOOD PRESSURE: 121 MMHG | HEART RATE: 89 BPM

## 2023-05-01 DIAGNOSIS — N92.6 IRREGULAR MENSES: Primary | ICD-10-CM

## 2023-05-01 PROCEDURE — 99214 OFFICE O/P EST MOD 30 MIN: CPT | Performed by: NURSE PRACTITIONER

## 2023-05-01 NOTE — PROGRESS NOTES
Check-out Note: Sign for permission for records from She saw a clinic at [place] [address] in [place]. She went  of this year.     -Can we get her [facility] records regarding her eye? Instrucciones para seguir    Return for follow up lk 1 month(eye, gyn). The pt verified her name and . Printed AVS, provided to pt and reviewed. Pt indicated understanding and had no questions. AMN Int # [id number]. Pt signed authorization for release of medical records from the [address] in [address] [place] [address] in [place]. Explained Access Now process to the pt. The pt was told the OW, would call them and schedule an appt to meet with them for Financial information and assist them in the application process. Then they would be told if they were approved for the program and if they were, they would be given a paper with the information to call and when to call AN, and they would schedule their own appt for AN GYN.  Lisbet Ewing RN    I faxed the 2 SANDY's to the [facility] and [facility]. Lisbet Ewing RN

## 2023-05-01 NOTE — PROGRESS NOTES
2023 : Bailee Ireland (: 1980) is a 38 y.o. female,  established patient, here for evaluation of the following chief complaint(s):  Abdominal Pain (Follow up for abdominal pain and pelvic exam.) and Eye Injury (Patient had eye injury to left side and needs follow up.)   -stuffy ears next time  ASSESSMENT/PLAN:  Below is the assessment and plan developed based on review of pertinent history, physical exam, labs, studies, and medications. 1. Irregular menses  -     REFERRAL TO GYNECOLOGY  The 10-year ASCVD risk score (Katharina DESAI, et al., 2019) is: 1.3%    Values used to calculate the score:      Age: 38 years      Sex: Female      Is Non- : No      Diabetic: No      Tobacco smoker: No      Systolic Blood Pressure: 895 mmHg      Is BP treated: No      HDL Cholesterol: 44 MG/DL      Total Cholesterol: 240 MG/DL    Return for follow up lk 1 month(eye, gyn, stuffy ears). Pain worse with her period and where she has the hernia. SUBJECTIVE/OBJECTIVE: She is spotting. Pelvic deferred. HPI Tenstrike Butterfield to [facility] on 3/16/23 for eye injury and needs follow up. States has finished the eye drops and no more pain. She has noticed farsightedness, but this is not new. Urine gonorrhea and chlamydia was negative. There was no UTI. Abdominal Pain (Pt c/o occasional \"ovarian pain\" on the right side,  Having spotting. Irregular periods. Spotting, goes 2-3 month without period. Recently had period, stopped for 2 weeks, then came back. Periods are lasting longer, 5 days. It was always strong and 3 days. Not pregnant. She saw a clinic at [place] [address] in [place]. She went  of this year. She had pap smear and it was normal.  Was told she was close to menopause and had treatment for 12 days and did not have her period. She was given drops when she had a paper cut in her eye. States blurry and is farsighted.   Current Medications:   Current Outpatient Medications Medication Sig    multivitamin (ONE A DAY) tablet Take 1 Tablet by mouth daily. Review of Systems: Negative for: fever, chest pain, shortness of breath, leg swelling. Social History:  reports that she has never smoked. She has never used smokeless tobacco. She reports that she does not drink alcohol and does not use drugs. Physical Examination: Patient's last menstrual period was 04/21/2023. Blood pressure 121/66, pulse 89, temperature 98.4 °F (36.9 °C), temperature source Temporal, height 5' 1.22\" (1.555 m), weight 157 lb (71.2 kg), last menstrual period 04/21/2023, SpO2 96 %. General appearance - well developed, no acute distress. Chest - clear to auscultation. Heart - regular rate and rhythm without murmurs, rubs, or gallops. Abdomen - bowel sounds present x 4, NT, ND  Extremities - no CCE. Difficult to see the cut left lower eye conjunctiva. An electronic signature was used to authenticate this note.   -- Janice Sue NP

## 2023-05-01 NOTE — PROGRESS NOTES
Coordination of Care  1. Have you been to the ER, urgent care clinic since your last visit? Hospitalized since your last visit? Yes [facility], eye injury    2. Have you seen or consulted any other health care providers outside of the [address] [place] [address] since your last visit? Include any pap smears or colon screening. No    Does the patient need refills? NO    Learning Assessment Complete?  yes  Depression Screening complete in the past 12 months? yes

## 2023-07-24 ENCOUNTER — OFFICE VISIT (OUTPATIENT)
Age: 43
End: 2023-07-24

## 2023-07-24 DIAGNOSIS — Z71.89 COUNSELING AND COORDINATION OF CARE: Primary | ICD-10-CM

## 2023-07-24 PROCEDURE — 99080 SPECIAL REPORTS OR FORMS: CPT | Performed by: NURSE PRACTITIONER

## 2023-07-24 SDOH — ECONOMIC STABILITY: TRANSPORTATION INSECURITY
IN THE PAST 12 MONTHS, HAS LACK OF TRANSPORTATION KEPT YOU FROM MEETINGS, WORK, OR FROM GETTING THINGS NEEDED FOR DAILY LIVING?: NO

## 2023-07-24 SDOH — ECONOMIC STABILITY: TRANSPORTATION INSECURITY
IN THE PAST 12 MONTHS, HAS THE LACK OF TRANSPORTATION KEPT YOU FROM MEDICAL APPOINTMENTS OR FROM GETTING MEDICATIONS?: NO

## 2023-07-24 ASSESSMENT — SOCIAL DETERMINANTS OF HEALTH (SDOH): HOW HARD IS IT FOR YOU TO PAY FOR THE VERY BASICS LIKE FOOD, HOUSING, MEDICAL CARE, AND HEATING?: NOT HARD AT ALL

## 2023-07-24 NOTE — PROGRESS NOTES
Patient came to  as a walk-in requesting assistance with information abut the AN program. Information has been provided for patient.

## 2023-07-25 ENCOUNTER — OFFICE VISIT (OUTPATIENT)
Age: 43
End: 2023-07-25

## 2023-07-25 VITALS
OXYGEN SATURATION: 96 % | WEIGHT: 155 LBS | HEIGHT: 61 IN | HEART RATE: 81 BPM | BODY MASS INDEX: 29.27 KG/M2 | DIASTOLIC BLOOD PRESSURE: 70 MMHG | SYSTOLIC BLOOD PRESSURE: 107 MMHG | TEMPERATURE: 98.2 F

## 2023-07-25 DIAGNOSIS — H69.83 EUSTACHIAN TUBE DYSFUNCTION, BILATERAL: ICD-10-CM

## 2023-07-25 DIAGNOSIS — N92.6 IRREGULAR MENSTRUATION, UNSPECIFIED: Primary | ICD-10-CM

## 2023-07-25 PROCEDURE — 99213 OFFICE O/P EST LOW 20 MIN: CPT | Performed by: NURSE PRACTITIONER

## 2023-07-25 RX ORDER — LORATADINE 10 MG/1
10 TABLET ORAL DAILY
Qty: 30 TABLET | Refills: 0 | Status: SHIPPED | OUTPATIENT
Start: 2023-07-25 | End: 2023-08-24

## 2023-07-25 ASSESSMENT — SOCIAL DETERMINANTS OF HEALTH (SDOH)
WITHIN THE LAST YEAR, HAVE TO BEEN RAPED OR FORCED TO HAVE ANY KIND OF SEXUAL ACTIVITY BY YOUR PARTNER OR EX-PARTNER?: NO
WITHIN THE LAST YEAR, HAVE YOU BEEN HUMILIATED OR EMOTIONALLY ABUSED IN OTHER WAYS BY YOUR PARTNER OR EX-PARTNER?: NO
WITHIN THE LAST YEAR, HAVE YOU BEEN KICKED, HIT, SLAPPED, OR OTHERWISE PHYSICALLY HURT BY YOUR PARTNER OR EX-PARTNER?: NO
WITHIN THE LAST YEAR, HAVE YOU BEEN AFRAID OF YOUR PARTNER OR EX-PARTNER?: NO

## 2023-07-25 ASSESSMENT — LIFESTYLE VARIABLES
HOW OFTEN DO YOU HAVE A DRINK CONTAINING ALCOHOL: NEVER
HOW MANY STANDARD DRINKS CONTAINING ALCOHOL DO YOU HAVE ON A TYPICAL DAY: PATIENT DOES NOT DRINK

## 2023-07-25 ASSESSMENT — PATIENT HEALTH QUESTIONNAIRE - PHQ9
1. LITTLE INTEREST OR PLEASURE IN DOING THINGS: 0
2. FEELING DOWN, DEPRESSED OR HOPELESS: 0
SUM OF ALL RESPONSES TO PHQ9 QUESTIONS 1 & 2: 0
SUM OF ALL RESPONSES TO PHQ QUESTIONS 1-9: 0

## 2023-08-23 ENCOUNTER — OFFICE VISIT (OUTPATIENT)
Age: 43
End: 2023-08-23

## 2023-08-23 DIAGNOSIS — Z71.89 COUNSELING AND COORDINATION OF CARE: Primary | ICD-10-CM

## 2023-08-23 PROCEDURE — 99080 SPECIAL REPORTS OR FORMS: CPT | Performed by: NURSE PRACTITIONER

## 2023-08-24 NOTE — PROGRESS NOTES
AN financial screening for renewal has been completed.  Patient has been informed she will receive a call from AN representative to schedule her with specialist.

## 2024-01-23 ENCOUNTER — OFFICE VISIT (OUTPATIENT)
Age: 44
End: 2024-01-23

## 2024-01-23 VITALS
SYSTOLIC BLOOD PRESSURE: 116 MMHG | TEMPERATURE: 97.7 F | OXYGEN SATURATION: 99 % | HEART RATE: 89 BPM | DIASTOLIC BLOOD PRESSURE: 59 MMHG

## 2024-01-23 DIAGNOSIS — E78.2 MIXED HYPERLIPIDEMIA: ICD-10-CM

## 2024-01-23 DIAGNOSIS — H60.61 CHRONIC OTITIS EXTERNA OF RIGHT EAR, UNSPECIFIED TYPE: Primary | ICD-10-CM

## 2024-01-23 PROCEDURE — 99214 OFFICE O/P EST MOD 30 MIN: CPT | Performed by: FAMILY MEDICINE

## 2024-01-23 RX ORDER — ACETIC ACID 20.65 MG/ML
4 SOLUTION AURICULAR (OTIC) 3 TIMES DAILY
Qty: 1 EACH | Refills: 0 | Status: SHIPPED | OUTPATIENT
Start: 2024-01-23 | End: 2024-01-30

## 2024-01-23 SDOH — ECONOMIC STABILITY: FOOD INSECURITY: WITHIN THE PAST 12 MONTHS, THE FOOD YOU BOUGHT JUST DIDN'T LAST AND YOU DIDN'T HAVE MONEY TO GET MORE.: NEVER TRUE

## 2024-01-23 SDOH — SOCIAL STABILITY: SOCIAL INSECURITY: WITHIN THE LAST YEAR, HAVE YOU BEEN HUMILIATED OR EMOTIONALLY ABUSED IN OTHER WAYS BY YOUR PARTNER OR EX-PARTNER?: NO

## 2024-01-23 SDOH — ECONOMIC STABILITY: INCOME INSECURITY: HOW HARD IS IT FOR YOU TO PAY FOR THE VERY BASICS LIKE FOOD, HOUSING, MEDICAL CARE, AND HEATING?: NOT HARD AT ALL

## 2024-01-23 SDOH — SOCIAL STABILITY: SOCIAL INSECURITY: WITHIN THE LAST YEAR, HAVE YOU BEEN AFRAID OF YOUR PARTNER OR EX-PARTNER?: NO

## 2024-01-23 SDOH — ECONOMIC STABILITY: HOUSING INSECURITY
IN THE LAST 12 MONTHS, WAS THERE A TIME WHEN YOU DID NOT HAVE A STEADY PLACE TO SLEEP OR SLEPT IN A SHELTER (INCLUDING NOW)?: NO

## 2024-01-23 SDOH — HEALTH STABILITY: MENTAL HEALTH: HOW MANY STANDARD DRINKS CONTAINING ALCOHOL DO YOU HAVE ON A TYPICAL DAY?: PATIENT DOES NOT DRINK

## 2024-01-23 SDOH — ECONOMIC STABILITY: HOUSING INSECURITY: IN THE LAST 12 MONTHS, HOW MANY PLACES HAVE YOU LIVED?: 1

## 2024-01-23 SDOH — SOCIAL STABILITY: SOCIAL INSECURITY
WITHIN THE LAST YEAR, HAVE TO BEEN RAPED OR FORCED TO HAVE ANY KIND OF SEXUAL ACTIVITY BY YOUR PARTNER OR EX-PARTNER?: NO

## 2024-01-23 SDOH — HEALTH STABILITY: MENTAL HEALTH: HOW OFTEN DO YOU HAVE A DRINK CONTAINING ALCOHOL?: NEVER

## 2024-01-23 SDOH — ECONOMIC STABILITY: FOOD INSECURITY: WITHIN THE PAST 12 MONTHS, YOU WORRIED THAT YOUR FOOD WOULD RUN OUT BEFORE YOU GOT MONEY TO BUY MORE.: NEVER TRUE

## 2024-01-23 SDOH — ECONOMIC STABILITY: INCOME INSECURITY: IN THE LAST 12 MONTHS, WAS THERE A TIME WHEN YOU WERE NOT ABLE TO PAY THE MORTGAGE OR RENT ON TIME?: NO

## 2024-01-23 SDOH — SOCIAL STABILITY: SOCIAL INSECURITY
WITHIN THE LAST YEAR, HAVE YOU BEEN KICKED, HIT, SLAPPED, OR OTHERWISE PHYSICALLY HURT BY YOUR PARTNER OR EX-PARTNER?: NO

## 2024-01-23 ASSESSMENT — PATIENT HEALTH QUESTIONNAIRE - PHQ9
SUM OF ALL RESPONSES TO PHQ QUESTIONS 1-9: 0
DEPRESSION UNABLE TO ASSESS: FUNCTIONAL CAPACITY MOTIVATION LIMITS ACCURACY
2. FEELING DOWN, DEPRESSED OR HOPELESS: 0
SUM OF ALL RESPONSES TO PHQ QUESTIONS 1-9: 0
SUM OF ALL RESPONSES TO PHQ QUESTIONS 1-9: 0
SUM OF ALL RESPONSES TO PHQ9 QUESTIONS 1 & 2: 0
1. LITTLE INTEREST OR PLEASURE IN DOING THINGS: 0
SUM OF ALL RESPONSES TO PHQ QUESTIONS 1-9: 0

## 2024-01-23 ASSESSMENT — ENCOUNTER SYMPTOMS
NAUSEA: 0
DIARRHEA: 0
COUGH: 0
ABDOMINAL PAIN: 0
CONSTIPATION: 0
SHORTNESS OF BREATH: 0

## 2024-01-23 NOTE — PROGRESS NOTES
Danielle Reed seen at discharge. Full name and  verified; After visit Summary was given. RN reviewed today's visit with patient, as well as instructions on when it is recommended to return for follow-up visit in 1 month. RN reviewed the provider's instructions with the patient, answering all questions to her satisfaction. Patient verbalized understanding. Due to language barrier, an  assisted during this encounter.   Good Rx coupon(s) provided to patient for the prescribed medication(s).  Natalya Golden RN

## 2024-01-23 NOTE — PROGRESS NOTES
Danielle Reed is a 43 y.o. female   Chief Complaint   Patient presents with    Cholesterol Problem     States 4 month they checked her cholesterol and she is concerned     Flu Vaccine     Does not want the flu vaccine today         ASSESSMENT AND PLAN:    1. Chronic otitis externa of right ear, unspecified type  - acetic acid (VOSOL) 2 % otic solution; Place 4 drops into the right ear 3 times daily for 7 days  Dispense: 1 each; Refill: 0    2. Mixed hyperlipidemia  Mild LDL elevation and TG elevation on labs 10/27.  Will institute dietary changes and follow up next month for a repeat lipid panel.  Also discussed OTC Omega 3 supplements.      SUBJECTIVE:    HPI:  Danielle Reed is a 43 y.o. female who presents with concerns about her cholesterol.  Every year at work they do some screening labs and she got her results in the mail with some abnormalities marked .  Cholesterol - Total 222, , HDL 37, .  GGT 37   BMI 28  Normal Transaminases.  Normal CBC.    Her LDL has actually improved since her check earlier in the year here.  She is feeling well overall.  She notes that she has chronic ear issues and lately has felt some pressure/irritation of her right ear.  Her periods have become irregular, now about every 3 months.      Review of Systems   Constitutional:  Negative for fatigue, fever and unexpected weight change.   HENT:  Positive for ear pain. Negative for ear discharge.    Eyes:  Negative for visual disturbance.   Respiratory:  Negative for cough and shortness of breath.    Cardiovascular:  Negative for chest pain and palpitations.   Gastrointestinal:  Negative for abdominal pain, constipation, diarrhea and nausea.   Neurological:  Negative for dizziness and headaches.         BP (!) 116/59 (Site: Right Upper Arm)   Pulse 89   Temp 97.7 °F (36.5 °C) (Temporal)   LMP 08/07/2023 (Exact Date)   SpO2 99%     Physical Exam  Constitutional:       General: She is not in acute distress.

## 2024-01-23 NOTE — PROGRESS NOTES
Due to language barrier, an  was present during the history-taking and subsequent discussion with this patient.    [facility]N [place] [id number]  \"Have you been to the ER, urgent care clinic since your last visit?  Hospitalized since your last visit?\"    NO    “Have you seen or consulted any other health care providers outside of [facility] since your last visit?”    NO    “Have you had a colorectal cancer screening such as a colonoscopy/FIT/Cologuard?    NO

## 2024-02-23 ENCOUNTER — TELEPHONE (OUTPATIENT)
Age: 44
End: 2024-02-23

## 2024-02-23 ENCOUNTER — HOSPITAL ENCOUNTER (OUTPATIENT)
Facility: HOSPITAL | Age: 44
Setting detail: SPECIMEN
Discharge: HOME | End: 2024-02-26

## 2024-02-23 ENCOUNTER — OFFICE VISIT (OUTPATIENT)
Age: 44
End: 2024-02-23

## 2024-02-23 VITALS
HEART RATE: 75 BPM | OXYGEN SATURATION: 100 % | SYSTOLIC BLOOD PRESSURE: 113 MMHG | DIASTOLIC BLOOD PRESSURE: 50 MMHG | WEIGHT: 151 LBS | BODY MASS INDEX: 28.33 KG/M2 | TEMPERATURE: 97.5 F

## 2024-02-23 DIAGNOSIS — E78.00 PURE HYPERCHOLESTEROLEMIA, UNSPECIFIED: ICD-10-CM

## 2024-02-23 DIAGNOSIS — Z13.9 SCREENING DUE: ICD-10-CM

## 2024-02-23 DIAGNOSIS — J31.0 CHRONIC RHINITIS: ICD-10-CM

## 2024-02-23 DIAGNOSIS — S46.011A ROTATOR CUFF STRAIN, RIGHT, INITIAL ENCOUNTER: Primary | ICD-10-CM

## 2024-02-23 DIAGNOSIS — H54.7 DECREASED VISUAL ACUITY: ICD-10-CM

## 2024-02-23 LAB
ALBUMIN SERPL-MCNC: 4.2 G/DL (ref 3.5–5)
ALBUMIN/GLOB SERPL: 1.2 (ref 1.1–2.2)
ALP SERPL-CCNC: 77 U/L (ref 45–117)
ALT SERPL-CCNC: 54 U/L (ref 12–78)
ANION GAP SERPL CALC-SCNC: 4 MMOL/L (ref 5–15)
AST SERPL-CCNC: 23 U/L (ref 15–37)
BASOPHILS # BLD: 0 K/UL (ref 0–0.1)
BASOPHILS NFR BLD: 1 % (ref 0–1)
BILIRUB SERPL-MCNC: 0.5 MG/DL (ref 0.2–1)
BUN SERPL-MCNC: 12 MG/DL (ref 6–20)
BUN/CREAT SERPL: 21 (ref 12–20)
CALCIUM SERPL-MCNC: 9.7 MG/DL (ref 8.5–10.1)
CHLORIDE SERPL-SCNC: 108 MMOL/L (ref 97–108)
CHOLEST SERPL-MCNC: 237 MG/DL
CO2 SERPL-SCNC: 27 MMOL/L (ref 21–32)
CREAT SERPL-MCNC: 0.57 MG/DL (ref 0.55–1.02)
DIFFERENTIAL METHOD BLD: NORMAL
EOSINOPHIL # BLD: 0 K/UL (ref 0–0.4)
EOSINOPHIL NFR BLD: 1 % (ref 0–7)
ERYTHROCYTE [DISTWIDTH] IN BLOOD BY AUTOMATED COUNT: 11.6 % (ref 11.5–14.5)
GLOBULIN SER CALC-MCNC: 3.4 G/DL (ref 2–4)
GLUCOSE SERPL-MCNC: 112 MG/DL (ref 65–100)
HCT VFR BLD AUTO: 45.8 % (ref 35–47)
HDLC SERPL-MCNC: 42 MG/DL
HDLC SERPL: 5.6 (ref 0–5)
HGB BLD-MCNC: 16 G/DL (ref 11.5–16)
IMM GRANULOCYTES # BLD AUTO: 0 K/UL (ref 0–0.04)
IMM GRANULOCYTES NFR BLD AUTO: 0 % (ref 0–0.5)
LDLC SERPL CALC-MCNC: 167.2 MG/DL (ref 0–100)
LYMPHOCYTES # BLD: 2.6 K/UL (ref 0.8–3.5)
LYMPHOCYTES NFR BLD: 45 % (ref 12–49)
MCH RBC QN AUTO: 30.8 PG (ref 26–34)
MCHC RBC AUTO-ENTMCNC: 34.9 G/DL (ref 30–36.5)
MCV RBC AUTO: 88.1 FL (ref 80–99)
MONOCYTES # BLD: 0.4 K/UL (ref 0–1)
MONOCYTES NFR BLD: 6 % (ref 5–13)
NEUTS SEG # BLD: 2.7 K/UL (ref 1.8–8)
NEUTS SEG NFR BLD: 47 % (ref 32–75)
NRBC # BLD: 0 K/UL (ref 0–0.01)
NRBC BLD-RTO: 0 PER 100 WBC
PLATELET # BLD AUTO: 311 K/UL (ref 150–400)
PMV BLD AUTO: 11.1 FL (ref 8.9–12.9)
POTASSIUM SERPL-SCNC: 4.3 MMOL/L (ref 3.5–5.1)
PROT SERPL-MCNC: 7.6 G/DL (ref 6.4–8.2)
RBC # BLD AUTO: 5.2 M/UL (ref 3.8–5.2)
SODIUM SERPL-SCNC: 139 MMOL/L (ref 136–145)
TRIGL SERPL-MCNC: 139 MG/DL
VLDLC SERPL CALC-MCNC: 27.8 MG/DL
WBC # BLD AUTO: 5.7 K/UL (ref 3.6–11)

## 2024-02-23 PROCEDURE — 86803 HEPATITIS C AB TEST: CPT

## 2024-02-23 PROCEDURE — 99214 OFFICE O/P EST MOD 30 MIN: CPT | Performed by: FAMILY MEDICINE

## 2024-02-23 PROCEDURE — 80061 LIPID PANEL: CPT

## 2024-02-23 PROCEDURE — 80053 COMPREHEN METABOLIC PANEL: CPT

## 2024-02-23 PROCEDURE — 85025 COMPLETE CBC W/AUTO DIFF WBC: CPT

## 2024-02-23 PROCEDURE — 36415 COLL VENOUS BLD VENIPUNCTURE: CPT

## 2024-02-23 RX ORDER — CYCLOBENZAPRINE HCL 10 MG
10 TABLET ORAL NIGHTLY PRN
Qty: 30 TABLET | Refills: 0 | Status: SHIPPED | OUTPATIENT
Start: 2024-02-23

## 2024-02-23 ASSESSMENT — PATIENT HEALTH QUESTIONNAIRE - PHQ9
1. LITTLE INTEREST OR PLEASURE IN DOING THINGS: 0
SUM OF ALL RESPONSES TO PHQ QUESTIONS 1-9: 0
SUM OF ALL RESPONSES TO PHQ9 QUESTIONS 1 & 2: 0
SUM OF ALL RESPONSES TO PHQ QUESTIONS 1-9: 0
2. FEELING DOWN, DEPRESSED OR HOPELESS: 0

## 2024-02-23 ASSESSMENT — ENCOUNTER SYMPTOMS
NAUSEA: 0
CONSTIPATION: 0
SINUS PRESSURE: 1
DIARRHEA: 0
SHORTNESS OF BREATH: 0
RHINORRHEA: 1
COUGH: 0
ABDOMINAL PAIN: 0

## 2024-02-23 NOTE — PROGRESS NOTES
Danielle Reed is a 43 y.o. female   Chief Complaint   Patient presents with    Hyperlipidemia     F/U for repeat lipid testing    Otalgia     F/u for ear pain, improved     Shoulder Pain     C/o pain in her right shoulder radiating down to her elbow and occasionally down her forearm          ASSESSMENT AND PLAN:    1. Rotator cuff strain, right, initial encounter  Scheduled naproxen (440mg BID) for 2 weeks.  Home exercises as tolerated.  Follow up in 6 weeks.    2. Chronic rhinitis  Use flonase daily.    3. Decreased visual acuity  Given vision resources list -- recommend optometry.    4. Pure hypercholesterolemia, unspecified  Repeat today.  - Lipid Panel; Future    5. Screening due  - Comprehensive Metabolic Panel; Future  - CBC with Auto Differential; Future  - Hepatitis C Antibody; Future        SUBJECTIVE:    HPI:  Danielle Reed is a 43 y.o. female who presents for followup on high cholesterol and otitis externa.    Today her main concern is right shoulder pain x3 months, worsening.  She has had to lift a lot for work (cleaning). No exact SAKINA. She woke up one morning with worsened pain.  She sleeps on that right side and she wakes up with a lot of pain.  After work she has a lot of pain.  Normal ROM of both shoulder and elbow but pain with movement.  Pain radiates down arm to forearm - no pain in wrist.  No tingling in hands  Sometimes has lateral neck pain.  No cervical spine tenderness.  She has taken Aleve or tylenol which helps the pain, but after work it returns.    Her diet has been very good. She stopped sugar, carbs, fat.  She may have a little soda every 8 days.  She has lost 4 lbs.  Interested in labs.  Her palpitations have resolved.    Her right ear is better. Today her left ear is bothering her a little.  Notes she has chronic rhinitis.  Has noticed her vision is a little worse.    Has flonase and aleve at home.    Review of Systems   Constitutional:  Negative for fatigue, fever and unexpected

## 2024-02-23 NOTE — PROGRESS NOTES
[facility]:  [place].  Alondra Acosta LPN    Patient name and date of birth verified by .  Patient given an after visit summary, reviewed medication on how and when to take ( flonase ) .   Advised to schedule next appointment before leaving clinic office.  Patient verbalized understanding of all information given at time of visit. Alondra Acosta LPN    Reviewed with patient the shoulder exercises to complete and to take aleve ( 2 tablets twice a day ) for 2 weeks.  Also given vision resource list and reviewed different locations she can get serviced.  Patient verbalized understanding of all information at time of visit.  Alondra Acosta LPN

## 2024-02-23 NOTE — PROGRESS NOTES
\"Have you been to the ER, urgent care clinic since your last visit?  Hospitalized since your last visit?\"    NO    “Have you seen or consulted any other health care providers outside of [facility] since your last visit?”    NO    “Have you had a colorectal cancer screening such as a colonoscopy/FIT/Cologuard?    NO

## 2024-02-23 NOTE — TELEPHONE ENCOUNTER
Chief Complaint   Patient presents with    Medication Refill     Flexeril 10 mg tablet       [facility] services:  [id number].  Alondra Acosta LPN    Patient name and date of birth verified by .  Patient informed that medication flexeril 10 mg tablet , take one tablet by mouth nightly as needed has been sent to pharmacy. Informed that a coupon for medication has been sent via text to present to pharmacy for medication discount.  Patient had no questions at time of call and verbalized understanding of all information given at time of call.  Alondra Acosta LPN

## 2024-02-23 NOTE — TELEPHONE ENCOUNTER
----- Message from Farida Joseph MD sent at 2/23/2024 12:13 PM EST -----  Oh yes, I'm sorry. I sent in flexeril.    ----- Message -----  From: Alondra Acosta LPN  Sent: 2/23/2024   9:29 AM EST  To: Farida Joseph MD    Patient stated / requesting for muscle relaxer.  I informed her I would let the provider know and will be contacted.  Thanks.  ARTHUR Acosta LPN

## 2024-02-24 LAB
HCV AB SER IA-ACNC: 0.07 INDEX
HCV AB SERPL QL IA: NONREACTIVE

## 2024-02-26 NOTE — RESULT ENCOUNTER NOTE
Please let the pt know that her triglycerides are normal this time, but her LDL cholesterol has increased a little bit.   Don't be discouraged, continue with you diet changes.  Otherwise normal labs.  She's not anemic, she does not have Hep C, normal liver and kidney labs.

## 2024-02-26 NOTE — RESULT ENCOUNTER NOTE
The patient was contacted with the assistance of [facility]  #[id number]. The following message was conveyed from Dr. PURNIMA Joseph: 'triglycerides are normal this time, but her LDL cholesterol has increased a little bit. Don't be discouraged, continue with you diet changes. Otherwise normal labs. She's not anemic, she does not have Hep C, normal liver and kidney labs.'    The patient was given an opportunity to voice questions/concerns. No questions at this time.

## 2024-03-05 ENCOUNTER — TELEPHONE (OUTPATIENT)
Age: 44
End: 2024-03-05

## 2024-03-05 DIAGNOSIS — J30.89 NON-SEASONAL ALLERGIC RHINITIS, UNSPECIFIED TRIGGER: Primary | ICD-10-CM

## 2024-03-05 RX ORDER — FLUTICASONE PROPIONATE 50 MCG
1 SPRAY, SUSPENSION (ML) NASAL DAILY
Qty: 1 EACH | Refills: 3 | Status: SHIPPED | OUTPATIENT
Start: 2024-03-05

## 2024-03-05 NOTE — TELEPHONE ENCOUNTER
Tc to the pt to let her know the Rx was sent to the Pharmacy by the provider. Rx has 3 refills. She verified understanding. Nataliia Cisse RN

## 2024-03-05 NOTE — TELEPHONE ENCOUNTER
Returned the call to the pt. She had left a message on the main office CBN line requesting the Fluticasone. The pt verified her name and . She stated the provider was going to order the Fluticasone during her appt but she did not due to the pt stated to the Dr that she still had some. Now she is out and requesting the rx to be sent to her Pharmacy. Message sent to the provider. Nataliia Cisse RN

## 2024-06-12 ENCOUNTER — OFFICE VISIT (OUTPATIENT)
Age: 44
End: 2024-06-12

## 2024-06-12 VITALS
DIASTOLIC BLOOD PRESSURE: 60 MMHG | HEART RATE: 82 BPM | TEMPERATURE: 97.9 F | OXYGEN SATURATION: 99 % | BODY MASS INDEX: 27.58 KG/M2 | SYSTOLIC BLOOD PRESSURE: 112 MMHG | WEIGHT: 147 LBS

## 2024-06-12 DIAGNOSIS — H00.011 HORDEOLUM EXTERNUM OF RIGHT UPPER EYELID: ICD-10-CM

## 2024-06-12 DIAGNOSIS — H60.61 CHRONIC OTITIS EXTERNA OF RIGHT EAR, UNSPECIFIED TYPE: Primary | ICD-10-CM

## 2024-06-12 DIAGNOSIS — H69.93 EUSTACHIAN TUBE DYSFUNCTION, BILATERAL: ICD-10-CM

## 2024-06-12 DIAGNOSIS — E78.00 PURE HYPERCHOLESTEROLEMIA, UNSPECIFIED: ICD-10-CM

## 2024-06-12 PROCEDURE — 99214 OFFICE O/P EST MOD 30 MIN: CPT | Performed by: FAMILY MEDICINE

## 2024-06-12 RX ORDER — HYDROCORTISONE AND ACETIC ACID 20.75; 10.375 MG/ML; MG/ML
3 SOLUTION AURICULAR (OTIC) 2 TIMES DAILY
Qty: 1 EACH | Refills: 0 | Status: SHIPPED | OUTPATIENT
Start: 2024-06-12 | End: 2024-06-22

## 2024-06-12 ASSESSMENT — ENCOUNTER SYMPTOMS
EYE PAIN: 1
ABDOMINAL PAIN: 0
COUGH: 0
EYE REDNESS: 1
DIARRHEA: 0
EYE ITCHING: 1
CONSTIPATION: 0
NAUSEA: 0
SINUS PRESSURE: 1
SHORTNESS OF BREATH: 0

## 2024-06-12 ASSESSMENT — PATIENT HEALTH QUESTIONNAIRE - PHQ9
SUM OF ALL RESPONSES TO PHQ QUESTIONS 1-9: 0
SUM OF ALL RESPONSES TO PHQ QUESTIONS 1-9: 0
2. FEELING DOWN, DEPRESSED OR HOPELESS: NOT AT ALL
SUM OF ALL RESPONSES TO PHQ QUESTIONS 1-9: 0
SUM OF ALL RESPONSES TO PHQ QUESTIONS 1-9: 0
SUM OF ALL RESPONSES TO PHQ9 QUESTIONS 1 & 2: 0
1. LITTLE INTEREST OR PLEASURE IN DOING THINGS: NOT AT ALL

## 2024-06-12 NOTE — PROGRESS NOTES
Danielle Reed is a 43 y.o. female   Chief Complaint   Patient presents with    Shoulder Pain     Follow up    Other     Ear pressure x1 day         ASSESSMENT AND PLAN:    1. Chronic otitis externa of right ear, unspecified type  Pt does not wish to see ENT at this time.  - acetic acid-hydrocortisone (ACETASOL HC) 1-2 % otic solution; Place 3 drops into the left ear 2 times daily for 10 days  Dispense: 1 each; Refill: 0    2. Eustachian tube dysfunction, bilateral  Continue flonase.    3. Pure hypercholesterolemia, unspecified  Will return for a fasting lipid panel.  - Lipid Panel; Future    4. Hordeolum externum of right upper eyelid  Recommend warm compresses BID.    SUBJECTIVE:    HPI:  Danielle Reed is a 43 y.o. female   She has been having issues with her eye.  She had a bump on her right lower eyelid. It resolved on it's own. About 2 weeks later she developed a lump on her upper eyelid. It became very inflammed and painful.  She went to a clinic b/c she couldn't get a [facility] appointment. She started some drops and eye gel (erythromycin) - didn't help at all.   Went to pt first, given keflex. She has had improvement since then but still has a small bump.  Now she has developed a headache and she is having a lot of pressure in her right ear. She took a tylenol and it helped with the headache.  She feels like there is water in her ear.  Her left ear is also bothersome but not as severe.  Has seen ENT but it was $1000/visit. She didn't feel she had much improvement after visits.  She had improvement after our last visit in February from vosol drops.  Has not recurred until this past week.    3 weeks ago, after leaving work she had an immediate change in pressure and had decreased hearing for about 2 days.    No fevers.  Still having some nasal congestion.  She didn't want to combine flonase and keflex at first but decided to take it this morning.    She tried cleaning with a qtip (which she normally

## 2024-06-12 NOTE — PROGRESS NOTES
Chief Complaint   Patient presents with    Shoulder Pain     Follow up    Other     Ear pressure x1 day      Care A Daniel:  Yadira.  Alondra Acosta LPN    Patient name and date of birth verified by .  Patient given an after visit summary, reviewed medication on how and when to take, coupon given to present to pharmacy for medication discount.  Advised to schedule next appointment before leaving clinic office.  Patient verbalized understanding of all information given at time of visit. Alondra Acosta LPN